# Patient Record
Sex: MALE | Race: WHITE
[De-identification: names, ages, dates, MRNs, and addresses within clinical notes are randomized per-mention and may not be internally consistent; named-entity substitution may affect disease eponyms.]

---

## 2020-01-06 ENCOUNTER — HOSPITAL ENCOUNTER (EMERGENCY)
Dept: HOSPITAL 33 - ED | Age: 30
Discharge: HOME | End: 2020-01-06
Payer: COMMERCIAL

## 2020-01-06 VITALS — HEART RATE: 116 BPM | SYSTOLIC BLOOD PRESSURE: 120 MMHG | OXYGEN SATURATION: 94 % | DIASTOLIC BLOOD PRESSURE: 69 MMHG

## 2020-01-06 DIAGNOSIS — R05: ICD-10-CM

## 2020-01-06 DIAGNOSIS — R50.9: ICD-10-CM

## 2020-01-06 DIAGNOSIS — J18.9: Primary | ICD-10-CM

## 2020-01-06 DIAGNOSIS — J02.0: ICD-10-CM

## 2020-01-06 LAB
ALBUMIN SERPL-MCNC: 4.4 G/DL (ref 3.5–5)
ALP SERPL-CCNC: 128 U/L (ref 38–126)
ALT SERPL-CCNC: 73 U/L (ref 0–50)
ANION GAP SERPL CALC-SCNC: 12.8 MEQ/L (ref 5–15)
AST SERPL QL: 55 U/L (ref 17–59)
BILIRUB BLD-MCNC: 0.9 MG/DL (ref 0.2–1.3)
BUN SERPL-MCNC: 10 MG/DL (ref 9–20)
CALCIUM SPEC-MCNC: 9.6 MG/DL (ref 8.4–10.2)
CHLORIDE SERPL-SCNC: 93 MMOL/L (ref 98–107)
CO2 SERPL-SCNC: 30 MMOL/L (ref 22–30)
CREAT SERPL-MCNC: 0.98 MG/DL (ref 0.66–1.25)
FLUAV AG NPH QL IA: NEGATIVE
FLUBV AG NPH QL IA: NEGATIVE
GLUCOSE SERPL-MCNC: 133 MG/DL (ref 74–106)
GLUCOSE UR-MCNC: NEGATIVE MG/DL
HCT VFR BLD AUTO: 44.1 % (ref 42–50)
HGB BLD-MCNC: 14.3 GM/DL (ref 12.5–18)
MCH RBC QN AUTO: 28.1 PG (ref 26–32)
MCHC RBC AUTO-ENTMCNC: 32.4 G/DL (ref 32–36)
PLATELET # BLD AUTO: 291 K/MM3 (ref 150–450)
POTASSIUM SERPLBLD-SCNC: 4.3 MMOL/L (ref 3.5–5.1)
PROT SERPL-MCNC: 9 G/DL (ref 6.3–8.2)
PROT UR STRIP-MCNC: 100 MG/DL
RBC # BLD AUTO: 5.08 M/MM3 (ref 4.1–5.6)
RBC #/AREA URNS HPF: (no result) /HPF (ref 0–2)
RSV AG SPEC QL IA: NEGATIVE
SODIUM SERPL-SCNC: 132 MMOL/L (ref 137–145)
WBC # BLD AUTO: 20.4 K/MM3 (ref 4–10.5)
WBC #/AREA URNS HPF: (no result) /HPF (ref 0–5)

## 2020-01-06 PROCEDURE — 81001 URINALYSIS AUTO W/SCOPE: CPT

## 2020-01-06 PROCEDURE — 94760 N-INVAS EAR/PLS OXIMETRY 1: CPT

## 2020-01-06 PROCEDURE — 87040 BLOOD CULTURE FOR BACTERIA: CPT

## 2020-01-06 PROCEDURE — 94640 AIRWAY INHALATION TREATMENT: CPT

## 2020-01-06 PROCEDURE — 71045 X-RAY EXAM CHEST 1 VIEW: CPT

## 2020-01-06 PROCEDURE — 96361 HYDRATE IV INFUSION ADD-ON: CPT

## 2020-01-06 PROCEDURE — 83605 ASSAY OF LACTIC ACID: CPT

## 2020-01-06 PROCEDURE — 87651 STREP A DNA AMP PROBE: CPT

## 2020-01-06 PROCEDURE — 94150 VITAL CAPACITY TEST: CPT

## 2020-01-06 PROCEDURE — 36415 COLL VENOUS BLD VENIPUNCTURE: CPT

## 2020-01-06 PROCEDURE — 96374 THER/PROPH/DIAG INJ IV PUSH: CPT

## 2020-01-06 PROCEDURE — 99284 EMERGENCY DEPT VISIT MOD MDM: CPT

## 2020-01-06 PROCEDURE — 86308 HETEROPHILE ANTIBODY SCREEN: CPT

## 2020-01-06 PROCEDURE — 80053 COMPREHEN METABOLIC PANEL: CPT

## 2020-01-06 PROCEDURE — 96360 HYDRATION IV INFUSION INIT: CPT

## 2020-01-06 PROCEDURE — 85025 COMPLETE CBC W/AUTO DIFF WBC: CPT

## 2020-01-06 PROCEDURE — 87631 RESP VIRUS 3-5 TARGETS: CPT

## 2020-01-06 PROCEDURE — 96375 TX/PRO/DX INJ NEW DRUG ADDON: CPT

## 2020-01-06 PROCEDURE — 96365 THER/PROPH/DIAG IV INF INIT: CPT

## 2020-01-06 NOTE — ERPHSYRPT
- History of Present Illness


Time Seen by Provider: 01/06/20 19:00


Source: patient, family


Exam Limitations: no limitations


Physician History: 





30 y/o white male presents with a couple day h/o cough, sore throat and fever. 

pt denies n/v/d. pt denies abd pain. 


Timing/Duration: day(s) (2 to 3 )


Cough Quality/Degree: moderate, dry cough


Possible Cause: no prior episodes


Modifying Factors: Improves With: coughing


Associated Symptoms: fever, cough, sore throat


Allergies/Adverse Reactions: 








No Known Drug Allergies Allergy (Unverified 01/06/20 19:03)


 








- Review of Systems


Constitutional: No Symptoms


Eyes: No Symptoms


Ears, Nose, & Throat: Throat Pain


Respiratory: Cough


Cardiac: No Symptoms


Abdominal/Gastrointestinal: No Symptoms


Genitourinary Symptoms: No Symptoms


Musculoskeletal: No Symptoms


Skin: No Symptoms


Neurological: No Symptoms


Psychological: No Symptoms


Endocrine: No Symptoms


Hematologic/Lymphatic: No Symptoms


Immunological/Allergic: No Symptoms


All Other Systems: Reviewed and Negative





- Past Medical History


Pertinent Past Medical History: Yes


Neurological History: No Pertinent History


ENT History: No Pertinent History


Cardiac History: No Pertinent History


Respiratory History: No Pertinent History


Endocrine Medical History: No Pertinent History


Musculoskeletal History: No Pertinent History


GI Medical History: No Pertinent History


 History: No Pertinent History


Psycho-Social History: No Pertinent History


Male Reproductive Disorders: No Pertinent History





- Past Surgical History


Neuro Surgical History: No Pertinent History


Cardiac: No Pertinent History


Respiratory: No Pertinent History


Gastrointestinal: No Pertinent History


Genitourinary: No Pertinent History


Musculoskeletal: No Pertinent History


Male Surgical History: No Pertinent History





- Nursing Vital Signs


Nursing Vital Signs: 


 Initial Vital Signs











Temperature  100.0 F   01/06/20 18:58


 


Pulse Rate  128 H  01/06/20 18:58


 


Respiratory Rate  24   01/06/20 18:58


 


Blood Pressure  149/96   01/06/20 18:58


 


O2 Sat by Pulse Oximetry  87 L  01/06/20 18:58








 Pain Scale











Pain Intensity                 4

















- Physical Exam


General Appearance: mild distress, alert, anxiety, obese


Eye Exam: PERRL/EOMI, eyes nml inspection


Ears, Nose, Throat Exam: normal ENT inspection, moist mucous membranes, 

pharyngeal erythema


Neck Exam: normal inspection, non-tender, supple, full range of motion


Respiratory Exam: normal breath sounds, lungs clear, airway intact, No chest 

tenderness, No respiratory distress


Cardiovascular Exam: normal heart sounds, normal peripheral pulses, tachycardia


Gastrointestinal/Abdomen Exam: soft, normal bowel sounds, No tenderness


Back Exam: normal inspection, normal range of motion, vertebral tenderness, No 

CVA tenderness


Extremity Exam: normal inspection, normal range of motion, pelvis stable


Neurologic Exam: alert, oriented x 3, cooperative, CNs II-XII nml as tested, 

normal mood/affect, nml cerebellar function, nml station & gait


Skin Exam: normal color, warm, dry


Lymphatic Exam: No adenopathy


**SpO2 Interpretation**: borderline oxygenation


O2 Delivery: Room Air





- Course


Nursing assessment & vital signs reviewed: Yes


Ordered Tests: 


 Active Orders 24 hr











 Category Date Time Status


 


 IV Insertion STAT Care  01/06/20 19:17 Active


 


 Pulse Oximetry (ED) STAT Care  01/06/20 19:17 Active


 


 CHEST 1 VIEW (PORTABLE) Stat Exams  01/06/20 19:19 Taken


 


 BLOOD CULTURE Stat Lab  01/06/20 19:08 Received


 


 CBC W DIFF Stat Lab  01/06/20 19:08 Completed


 


 CMP Stat Lab  01/06/20 19:08 Completed


 


 Lactic Acid Stat Lab  01/06/20 19:50 Completed


 


 Manual Differential NC Stat Lab  01/06/20 19:08 Completed


 


 Mono Screen Stat Lab  01/06/20 19:08 Completed


 


 UA W/RFX UR CULTURE Stat Lab  01/06/20 21:35 Completed


 


 Peak Expiratory Flow Rate ONCE RT  01/06/20 20:19 Active


 


 Respiratory Therapy Assessment DAILY RT  01/06/20 20:20 Active








Medication Summary











Generic Name Dose Route Start Last Admin





  Trade Name Freq  PRN Reason Stop Dose Admin


 


Sodium Chloride  1,000 mls @ 999 mls/hr  01/06/20 22:21  





  Sodium Chloride 0.9% 1000 Ml  IV  01/06/20 23:21  





  .Q1H1M STA   





     





     





     





     














Discontinued Medications














Generic Name Dose Route Start Last Admin





  Trade Name Freq  PRN Reason Stop Dose Admin


 


Hydrocodone Bitart/Acetaminophen  15 ml  01/06/20 19:20  01/06/20 20:21





  Hydrocodone-Acetamin 2.5-108/5 Ml Solution  PO  01/06/20 19:21  15 ml





  STAT STA   Administration





     





     





     





     


 


Hydrocodone Bitart/Acetaminophen  Confirm  01/06/20 20:17  





  Hydrocodone-Acetamin 2.5-108/5 Ml Solution  Administered  01/06/20 20:18  





  Dose   





  15 ml   





  .ROUTE   





  .STK-MED ONE   





     





     





     





     


 


Albuterol Sulfate  2.5 mg  01/06/20 20:19  01/06/20 20:22





  Proventil 2.5 Mg/3 Ml Neb***  IH  01/06/20 20:20  2.5 mg





  STAT ONE   Administration





     





     





     





     


 


Albuterol Sulfate  Confirm  01/06/20 20:18  





  Proventil 2.5 Mg/3 Ml Neb***  Administered  01/06/20 20:19  





  Dose   





  2.5 mg   





  IH   





  .STK-MED ONE   





     





     





     





     


 


Sodium Chloride  1,000 mls @ 999 mls/hr  01/06/20 19:17  01/06/20 20:19





  Sodium Chloride 0.9% 1000 Ml  IV  01/06/20 20:17  999 mls/hr





  .Q1H1M STA   Administration





     





     





     





     


 


Ceftriaxone Sodium/Dextrose  1 g in 50 mls @ 100 mls/hr  01/06/20 19:57  01/06/ 20 20:20





  Rocephin 1 Gm-D5w 50 Ml Bag**  IV  01/06/20 20:26  100 mls/hr





  STAT STA   100 mls/hr





     Administration





     





     





     


 


Sodium Chloride  Confirm  01/06/20 20:18  





  Sodium Chloride 0.9% 1000 Ml  Administered  01/06/20 20:19  





  Dose   





  1,000 mls @ ud   





  .ROUTE   





  .STK-MED ONE   





     





     





     





     


 


Ceftriaxone Sodium/Dextrose  Confirm  01/06/20 20:18  





  Rocephin 1 Gm-D5w 50 Ml Bag**  Administered  01/06/20 20:19  





  Dose   





  1 g in 50 mls @ ud   





  IV   





  .STK-MED ONE   





     





     





     





     


 


Ibuprofen  600 mg  01/06/20 19:17  01/06/20 20:21





  Motrin 600 Mg***  PO  01/06/20 19:18  600 mg





  STAT STA   Administration





     





     





     





     


 


Ibuprofen  Confirm  01/06/20 20:17  





  Motrin 600 Mg***  Administered  01/06/20 20:18  





  Dose   





  600 mg   





  .ROUTE   





  .STK-MED ONE   





     





     





     





     


 


Methylprednisolone Sodium Succinate  125 mg  01/06/20 19:57  01/06/20 20:20





  Solu-Medrol 125 Mg***  IV  01/06/20 19:58  125 mg





  STAT ONE   Administration





     





     





     





     


 


Methylprednisolone Sodium Succinate  Confirm  01/06/20 20:18  





  Solu-Medrol 125 Mg***  Administered  01/06/20 20:19  





  Dose   





  125 mg   





  .ROUTE   





  .STK-MED ONE   





     





     





     





     


 


Ondansetron HCl  4 mg  01/06/20 19:17  01/06/20 20:20





  Zofran 4 Mg/2 Ml Vial**  IV  01/06/20 19:18  4 mg





  STAT STA   Administration





     





     





     





     


 


Ondansetron HCl  Confirm  01/06/20 20:17  





  Zofran 4 Mg/2 Ml Vial**  Administered  01/06/20 20:18  





  Dose   





  4 mg   





  .ROUTE   





  .[facility]-MED ONE   





     





     





     





     











Lab/Rad Data: 


 Laboratory Result Diagrams





 01/06/20 19:08 





 01/06/20 19:08 





 Laboratory Results











  01/06/20 01/06/20 01/06/20 Range/Units





  21:35 19:50 19:43 


 


WBC     (4.0-10.5)  K/mm3


 


RBC     (4.1-5.6)  M/mm3


 


Hgb     (12.5-18.0)  gm/dl


 


Hct     (42-50)  %


 


MCV     ()  fl


 


MCH     (26-32)  pg


 


MCHC     (32-36)  g/dl


 


RDW     (11.5-14.0)  %


 


Plt Count     (150-450)  K/mm3


 


MPV     (7.5-11.0)  fl


 


Sodium     (137-145)  mmol/L


 


Potassium     (3.5-5.1)  mmol/L


 


Chloride     ()  mmol/L


 


Carbon Dioxide     (22-30)  mmol/L


 


Anion Gap     (5-15)  MEQ/L


 


BUN     (9-20)  mg/dL


 


Creatinine     (0.66-1.25)  mg/dL


 


Estimated GFR     ML/MIN


 


Glucose     ()  mg/dL


 


Lactic Acid   1.5   (0.4-2.0)  


 


Calcium     (8.4-10.2)  mg/dL


 


Total Bilirubin     (0.2-1.3)  mg/dL


 


AST     (17-59)  U/L


 


ALT     (0-50)  U/L


 


Alkaline Phosphatase     ()  U/L


 


Serum Total Protein     (6.3-8.2)  g/dL


 


Albumin     (3.5-5.0)  g/dL


 


Urine Color  SUSIE    (YELLOW)  


 


Urine Appearance  SLIGHTLY CLOUDY    (CLEAR)  


 


Urine pH  5.0    (5-6)  


 


Ur Specific Gravity  1.027    (1.005-1.025)  


 


Urine Protein  100    (Negative)  


 


Urine Ketones  TRACE    (NEGATIVE)  


 


Urine Blood  SMALL    (0-5)  Sylvain/ul


 


Urine Nitrite  NEGATIVE    (NEGATIVE)  


 


Urine Bilirubin  NEGATIVE    (NEGATIVE)  


 


Urine Urobilinogen  2    (0-1)  mg/dL


 


Ur Leukocyte Esterase  NEGATIVE    (NEGATIVE)  


 


Urine WBC (Auto)  0-2    (0-5)  /HPF


 


Urine RBC (Auto)  0-2    (0-2)  /HPF


 


U Epithel Cells (Auto)  RARE    (FEW)  /HPF


 


Urine Bacteria (Auto)  NONE    (NEGATIVE)  /HPF


 


Urine Mucus (Auto)  SLIGHT    (NEGATIVE)  /HPF


 


Urine Culture Reflexed  NO    (NO)  


 


Urine Glucose  NEGATIVE    (NEGATIVE)  mg/dL


 


Monoscreen     (Negative)  


 


Influenza Type A Ag    NEGATIVE  (NEGATIVE)  


 


Influenza Type B Ag    NEGATIVE  (NEGATIVE)  


 


RSV (PCR)    NEGATIVE  (Negative)  


 


Group A Strep Antibody    POSITIVE  (NEGATIVE)  














  01/06/20 01/06/20 01/06/20 Range/Units





  19:08 19:08 19:08 


 


WBC    20.4 H  (4.0-10.5)  K/mm3


 


RBC    5.08  (4.1-5.6)  M/mm3


 


Hgb    14.3  (12.5-18.0)  gm/dl


 


Hct    44.1  (42-50)  %


 


MCV    86.8  ()  fl


 


MCH    28.1  (26-32)  pg


 


MCHC    32.4  (32-36)  g/dl


 


RDW    14.0  (11.5-14.0)  %


 


Plt Count    291  (150-450)  K/mm3


 


MPV    12.8 H  (7.5-11.0)  fl


 


Sodium   132 L   (137-145)  mmol/L


 


Potassium   4.3   (3.5-5.1)  mmol/L


 


Chloride   93 L   ()  mmol/L


 


Carbon Dioxide   30   (22-30)  mmol/L


 


Anion Gap   12.8   (5-15)  MEQ/L


 


BUN   10   (9-20)  mg/dL


 


Creatinine   0.98   (0.66-1.25)  mg/dL


 


Estimated GFR   > 60.0   ML/MIN


 


Glucose   133 H   ()  mg/dL


 


Lactic Acid     (0.4-2.0)  


 


Calcium   9.6   (8.4-10.2)  mg/dL


 


Total Bilirubin   0.90   (0.2-1.3)  mg/dL


 


AST   55   (17-59)  U/L


 


ALT   73 H   (0-50)  U/L


 


Alkaline Phosphatase   128 H   ()  U/L


 


Serum Total Protein   9.0 H   (6.3-8.2)  g/dL


 


Albumin   4.4   (3.5-5.0)  g/dL


 


Urine Color     (YELLOW)  


 


Urine Appearance     (CLEAR)  


 


Urine pH     (5-6)  


 


Ur Specific Gravity     (1.005-1.025)  


 


Urine Protein     (Negative)  


 


Urine Ketones     (NEGATIVE)  


 


Urine Blood     (0-5)  Sylvain/ul


 


Urine Nitrite     (NEGATIVE)  


 


Urine Bilirubin     (NEGATIVE)  


 


Urine Urobilinogen     (0-1)  mg/dL


 


Ur Leukocyte Esterase     (NEGATIVE)  


 


Urine WBC (Auto)     (0-5)  /HPF


 


Urine RBC (Auto)     (0-2)  /HPF


 


U Epithel Cells (Auto)     (FEW)  /HPF


 


Urine Bacteria (Auto)     (NEGATIVE)  /HPF


 


Urine Mucus (Auto)     (NEGATIVE)  /HPF


 


Urine Culture Reflexed     (NO)  


 


Urine Glucose     (NEGATIVE)  mg/dL


 


Monoscreen  NEGATIVE    (Negative)  


 


Influenza Type A Ag     (NEGATIVE)  


 


Influenza Type B Ag     (NEGATIVE)  


 


RSV (PCR)     (Negative)  


 


Group A Strep Antibody     (NEGATIVE)  














- Progress


Progress: improved


Air Movement: good


Progress Note: 





01/06/20 20:22


cxr-right perihilar and right lower lobe infiltrate


01/06/20 22:33


pt states he is feeling much better and does not wish to be transferred or 

admitted. he feels well enough to go home. will give pt another liter of fluid 

and provide cough medicine for home.


Blood Culture(s) Obtained: Yes


Antibiotics given: Yes


Counseled pt/family regarding: lab results, diagnosis, need for follow-up, rad 

results





- Departure


Departure Disposition: Home


Clinical Impression: 


 Pneumonia, Strep pharyngitis





Condition: Stable


Critical Care Time: No


Referrals: 


ORALIA LAZO [ACTIVE STAFF] - 


Additional Instructions: 


drink plenty of fluids. add ibuprofen for fever. follow up with primary doctor 

for persistent symptoms


Prescriptions: 


Albuterol 8 gm Mdi Hfa*** [Ventolin Hfa MDI***] 8 gm IH Q4H #1 hfa.aer.[id number]


Azithromycin 250 mg*** [Zithromax 250 MG TABLET***] 250 mg PO ZPACK #6 tablet


Hydrocodone Bit/Acetaminophen [Hydrocodone-Acetaminophen Soln] 10 ml PO Q6H #

120 ml


Prednisone 10 mg*** [Deltasone 10 mg***] 10 mg PO TID #12 tablet

## 2020-01-07 LAB
CELLS COUNTED: 100
MANUAL DIF COMMENT BLD-IMP: NORMAL

## 2020-01-07 NOTE — XRAY
Indication: Fever and cough.



Comparison: None



Portable chest demonstrates hazy right base infiltrate/atelectasis.  Remaining

heart and left lung normal.  Bony thorax intact.